# Patient Record
Sex: MALE
[De-identification: names, ages, dates, MRNs, and addresses within clinical notes are randomized per-mention and may not be internally consistent; named-entity substitution may affect disease eponyms.]

---

## 2024-06-13 ENCOUNTER — EXTERNAL RECORD (OUTPATIENT)
Dept: OTHER | Facility: PHYSICIAN GROUP | Age: 54
End: 2024-06-13

## 2024-06-13 ENCOUNTER — EXTERNAL RECORD (OUTPATIENT)
Dept: NEPHROLOGY | Age: 54
End: 2024-06-13

## 2024-06-13 ENCOUNTER — OFF PREMISE (OUTPATIENT)
Dept: NEPHROLOGY | Age: 54
End: 2024-06-13

## 2024-06-13 ENCOUNTER — TELEPHONE (OUTPATIENT)
Dept: CARDIOLOGY | Age: 54
End: 2024-06-13

## 2024-06-13 ENCOUNTER — EXTERNAL RECORD (OUTPATIENT)
Dept: OTHER | Age: 54
End: 2024-06-13

## 2024-06-13 ENCOUNTER — APPOINTMENT (OUTPATIENT)
Dept: OTHER | Facility: PHYSICIAN GROUP | Age: 54
End: 2024-06-13

## 2024-06-13 DIAGNOSIS — N18.9 CHRONIC KIDNEY DISEASE, UNSPECIFIED CKD STAGE: ICD-10-CM

## 2024-06-13 DIAGNOSIS — E87.6 HYPOKALEMIA: ICD-10-CM

## 2024-06-13 DIAGNOSIS — N18.5 CKD (CHRONIC KIDNEY DISEASE) STAGE 5, GFR LESS THAN 15 ML/MIN  (CMD): ICD-10-CM

## 2024-06-13 DIAGNOSIS — I10 PRIMARY HYPERTENSION: ICD-10-CM

## 2024-06-13 DIAGNOSIS — E87.1 HYPONATREMIA: ICD-10-CM

## 2024-06-13 DIAGNOSIS — N17.9 AKI (ACUTE KIDNEY INJURY) (CMD): Primary | ICD-10-CM

## 2024-06-13 DIAGNOSIS — E83.51 HYPOCALCEMIA: ICD-10-CM

## 2024-06-13 DIAGNOSIS — I10 ESSENTIAL HYPERTENSION: ICD-10-CM

## 2024-06-13 DIAGNOSIS — E78.2 MIXED HYPERLIPIDEMIA: ICD-10-CM

## 2024-06-13 DIAGNOSIS — E87.20 ACIDOSIS: ICD-10-CM

## 2024-06-13 DIAGNOSIS — I73.9 PVD (PERIPHERAL VASCULAR DISEASE) (CMD): ICD-10-CM

## 2024-06-13 DIAGNOSIS — I21.11 ST ELEVATION MYOCARDIAL INFARCTION INVOLVING RIGHT CORONARY ARTERY  (CMD): Primary | ICD-10-CM

## 2024-06-13 DIAGNOSIS — I25.2 HISTORY OF ST ELEVATION MYOCARDIAL INFARCTION (STEMI): Primary | ICD-10-CM

## 2024-06-13 DIAGNOSIS — I21.3 ST ELEVATION MYOCARDIAL INFARCTION (STEMI), UNSPECIFIED ARTERY  (CMD): ICD-10-CM

## 2024-06-13 PROBLEM — Z78.9 NONSMOKER: Status: ACTIVE | Noted: 2024-06-13

## 2024-06-13 PROBLEM — I25.10 CAD (CORONARY ARTERY DISEASE): Status: ACTIVE | Noted: 2024-06-13

## 2024-06-13 PROBLEM — E78.5 HLD (HYPERLIPIDEMIA): Status: ACTIVE | Noted: 2024-06-13

## 2024-06-13 LAB
*MEAN CORPUSCULAR HGB CONC: 34 G/DL (ref 32.5–35.8)
A/G RATIO: 1.32 (ref 1.1–2.4)
ALANINE AMINOTRANSFE: 28 U/L (ref 7–52)
ALBUMIN, SERUM (ALB): 4.1 G/DL (ref 3.5–5.7)
ALKALINE PHOSPHATASE (ALK): 133 U/L (ref 34–104)
ANION GAP: 20 MEQ/L (ref 6–15)
ASPARTATE AMINOTRANS: 58 U/L (ref 13–39)
BASOPHIL AUTOMATED: 0.3 %
BASOPHILS: 0 (ref 0–0.14)
BILIRUBIN, TOTAL: 0.4 MG/DL (ref 0.3–1)
BLOOD UREA NITROGEN (BUN): 149 MG/DL (ref 7–25)
BUN/CREATININE RATIO: 11.6 (ref 7.4–23)
CALCIUM ALBUMIN ADJUSTED: 7.1 MG/DL (ref 8.6–10.3)
CALCIUM, SERUM: 7.2 MG/DL (ref 8.6–10.3)
CARBON DIOXIDE: 13 MEQ/L (ref 21–31)
CHLORIDE, SERUM: 99 MMOL/L (ref 98–107)
CHOLESTEROL HDL RATIO: 5
CHOLESTEROL: 136 MG/DL
CHRONIC KIDNEY DISEASE STAGE: ABNORMAL
CREATININE: 12.86 MG/DL (ref 0.7–1.3)
EOSINOPHIL AUTOMATED: 3.8 %
EOSINOPHILS: 0.5 (ref 0–0.6)
EST GLOMERULAR FILTRATION RATE: 4 ML/MIN
GLUCOSE: 213 MG/DL (ref 70–99)
HDL CHOLESTEROL: 27 MG/DL
HEMATOCRIT: 24.6 % (ref 38.6–49.2)
HEMOGLOBIN: 8.3 GM/DL (ref 13–17)
K (POTASSIUM, SERUM): 3.4 MMOL/L (ref 3.5–5.2)
LDL CHOLESTEROL DIRECT: 90 MG/DL (ref 0–129)
LYMPHOCYTE AUTOMATED: 5.4 %
LYMPHOCYTES: 0.7 (ref 0.78–3.73)
MEAN CORPUSCULAR HGB: 27.4 PG (ref 26–34)
MEAN CORPUSCULAR VOL: 80.6 FL (ref 80–100)
MEAN PLATELET VOLUME: 9.4 FL (ref 6.8–10.2)
MG (MAGNESIUM, SERUM: 1.7 MG/DL (ref 1.6–2.6)
MONOCYTE AUTOMATED: 3.5 %
MONOCYTES: 0.4 (ref 0.17–1)
NA (SODIUM, SERUM): 132 MMOL/L (ref 133–144)
NEUTROPHIL ABSOLUTE: 10.8 (ref 1.91–7.6)
NEUTROPHIL AUTOMATED: 87 %
NON HDL CHOLESTEROL: 109 MG/DL
PHOSPHORUS, SERUM: 8.9 MG/DL (ref 2.5–4.5)
PLATELET COUNT: 425 K/MM3 (ref 150–450)
PROTEIN TOTAL: 7.2 G/DL (ref 6.4–8.9)
RED BLOOD CELL COUNT: 3.05 M/MM3 (ref 4.34–5.6)
RED CELL DISTRIBUTIO: 13.4 % (ref 11.9–15.9)
TRIGLYCERIDES: 93 MG/DL
TROPONIN I HIGH SENSITIVITY: ABNORMAL PG/ML (ref 0–20)
TROPONIN I HIGH SENSITIVITY: ABNORMAL PG/ML (ref 0–20)
VLDL CHOLESTEROL: 19 MG/DL (ref 5–30)
WHITE BLOOD CELL COU: 12.4 K/MM3 (ref 4–11)

## 2024-06-13 PROCEDURE — 99255 IP/OBS CONSLTJ NEW/EST HI 80: CPT | Performed by: INTERNAL MEDICINE

## 2024-06-13 PROCEDURE — 92978 ENDOLUMINL IVUS OCT C 1ST: CPT | Performed by: INTERNAL MEDICINE

## 2024-06-13 PROCEDURE — 99152 MOD SED SAME PHYS/QHP 5/>YRS: CPT | Performed by: INTERNAL MEDICINE

## 2024-06-13 PROCEDURE — 99291 CRITICAL CARE FIRST HOUR: CPT | Performed by: INTERNAL MEDICINE

## 2024-06-13 PROCEDURE — 93458 L HRT ARTERY/VENTRICLE ANGIO: CPT | Performed by: INTERNAL MEDICINE

## 2024-06-13 PROCEDURE — 92941 PRQ TRLML REVSC TOT OCCL AMI: CPT | Performed by: INTERNAL MEDICINE

## 2024-06-14 ENCOUNTER — EXTERNAL RECORD (OUTPATIENT)
Dept: HEALTH INFORMATION MANAGEMENT | Facility: OTHER | Age: 54
End: 2024-06-14

## 2024-06-14 ENCOUNTER — OFF PREMISE (OUTPATIENT)
Dept: NEPHROLOGY | Age: 54
End: 2024-06-14

## 2024-06-14 DIAGNOSIS — N13.30 HYDRONEPHROSIS, RIGHT: ICD-10-CM

## 2024-06-14 DIAGNOSIS — N17.9 AKI (ACUTE KIDNEY INJURY) (CMD): Primary | ICD-10-CM

## 2024-06-14 DIAGNOSIS — I10 ESSENTIAL HYPERTENSION: ICD-10-CM

## 2024-06-14 DIAGNOSIS — N18.9 CHRONIC KIDNEY DISEASE, UNSPECIFIED CKD STAGE: ICD-10-CM

## 2024-06-14 DIAGNOSIS — I21.3 ST ELEVATION MYOCARDIAL INFARCTION (STEMI), UNSPECIFIED ARTERY  (CMD): ICD-10-CM

## 2024-06-14 LAB
% SATURATION: 44 % (ref 20–55)
*MEAN CORPUSCULAR HGB CONC: 34.9 G/DL (ref 32.5–35.8)
A/G RATIO: 1.25 (ref 1.1–2.4)
ALANINE AMINOTRANSFE: 39 U/L (ref 7–52)
ALBUMIN, SERUM (ALB): 3.5 G/DL (ref 3.5–5.7)
ALKALINE PHOSPHATASE (ALK): 113 U/L (ref 34–104)
ANION GAP: 16 MEQ/L (ref 6–15)
ASPARTATE AMINOTRANS: 125 U/L (ref 13–39)
BASOPHIL AUTOMATED: 0.3 %
BASOPHILS: 0 (ref 0–0.14)
BEDSIDE GLUCOSE: 287 MG/DL (ref 70–110)
BILIRUBIN, TOTAL: 0.3 MG/DL (ref 0.3–1)
BLOOD UREA NITROGEN (BUN): 154 MG/DL (ref 7–25)
BUN/CREATININE RATIO: 12.2 (ref 7.4–23)
CALCIUM ALBUMIN ADJUSTED: 7.3 MG/DL (ref 8.6–10.3)
CALCIUM, SERUM: 6.9 MG/DL (ref 8.6–10.3)
CARBON DIOXIDE: 17 MEQ/L (ref 21–31)
CHLORIDE, SERUM: 104 MMOL/L (ref 98–107)
CHRONIC KIDNEY DISEASE STAGE: ABNORMAL
CREATININE: 12.58 MG/DL (ref 0.7–1.3)
EOSINOPHIL AUTOMATED: 14.2 %
EOSINOPHILS: 1.5 (ref 0–0.6)
EST GLOMERULAR FILTRATION RATE: 4 ML/MIN
FERRITIN: 321 NG/ML (ref 24–336)
GLUCOSE: 96 MG/DL (ref 70–99)
HEMATOCRIT: 21.9 % (ref 38.6–49.2)
HEMOGLOBIN: 7.6 GM/DL (ref 13–17)
HEPATITIS B CORE AB, TOTAL: NORMAL
HEPATITIS B SURFACE AB: <3.1 MIU/ML
HEPATITIS B SURFACE ANTIGEN: NORMAL
HEPATITIS C ANTIBODY: NORMAL
IRON BINDING CAPACIT: 223 UG/DL (ref 228–428)
IRON, SERUM (IRON): 99 UG/DL (ref 50–212)
K (POTASSIUM, SERUM): 3.6 MMOL/L (ref 3.5–5.2)
LYMPHOCYTE AUTOMATED: 8.1 %
LYMPHOCYTES: 0.9 (ref 0.78–3.73)
MEAN CORPUSCULAR HGB: 28.2 PG (ref 26–34)
MEAN CORPUSCULAR VOL: 80.7 FL (ref 80–100)
MEAN PLATELET VOLUME: 9.2 FL (ref 6.8–10.2)
MG (MAGNESIUM, SERUM: 2.1 MG/DL (ref 1.6–2.6)
MONOCYTE AUTOMATED: 9.9 %
MONOCYTES: 1.1 (ref 0.17–1)
MRSA SCREEN, PCR: NOT DETECTED
MRSA SOURCE: NORMAL
NA (SODIUM, SERUM): 137 MMOL/L (ref 133–144)
NEUTROPHIL ABSOLUTE: 7.2 (ref 1.91–7.6)
NEUTROPHIL AUTOMATED: 67.5 %
PHOSPHORUS, SERUM: 8.4 MG/DL (ref 2.5–4.5)
PLATELET COUNT: 351 K/MM3 (ref 150–450)
PROTEIN TOTAL: 6.3 G/DL (ref 6.4–8.9)
RED BLOOD CELL COUNT: 2.71 M/MM3 (ref 4.34–5.6)
RED CELL DISTRIBUTIO: 13.3 % (ref 11.9–15.9)
TROPONIN I HIGH SENSITIVITY: ABNORMAL PG/ML (ref 0–20)
TROPONIN I HIGH SENSITIVITY: ABNORMAL PG/ML (ref 0–20)
UNSATURATED IRON BINDING CAP: 124 UG/DL (ref 155–300)
WHITE BLOOD CELL COU: 10.6 K/MM3 (ref 4–11)

## 2024-06-14 PROCEDURE — 99232 SBSQ HOSP IP/OBS MODERATE 35: CPT | Performed by: INTERNAL MEDICINE

## 2024-06-15 ENCOUNTER — OFF PREMISE (OUTPATIENT)
Dept: NEPHROLOGY | Age: 54
End: 2024-06-15

## 2024-06-15 DIAGNOSIS — D63.1 ANEMIA DUE TO END STAGE RENAL DISEASE  (CMD): ICD-10-CM

## 2024-06-15 DIAGNOSIS — Z99.2 ESRD (END STAGE RENAL DISEASE) ON DIALYSIS  (CMD): Primary | ICD-10-CM

## 2024-06-15 DIAGNOSIS — N18.6 ESRD (END STAGE RENAL DISEASE) ON DIALYSIS  (CMD): Primary | ICD-10-CM

## 2024-06-15 DIAGNOSIS — D62 ACUTE BLOOD LOSS ANEMIA: ICD-10-CM

## 2024-06-15 DIAGNOSIS — E11.21 DIABETIC NEPHROPATHY ASSOCIATED WITH TYPE 2 DIABETES MELLITUS  (CMD): ICD-10-CM

## 2024-06-15 DIAGNOSIS — N18.6 ANEMIA DUE TO END STAGE RENAL DISEASE  (CMD): ICD-10-CM

## 2024-06-15 DIAGNOSIS — I21.3 ST ELEVATION MYOCARDIAL INFARCTION (STEMI), UNSPECIFIED ARTERY  (CMD): ICD-10-CM

## 2024-06-15 LAB
*MEAN CORPUSCULAR HGB CONC: 35.8 G/DL (ref 32.5–35.8)
ALBUMIN, SERUM (ALB): 3.3 G/DL (ref 3.5–5.7)
ANION GAP: 11 MEQ/L (ref 6–15)
BASOPHIL AUTOMATED: 0.4 %
BASOPHILS: 0 (ref 0–0.14)
BLOOD UREA NITROGEN (BUN): 84 MG/DL (ref 7–25)
BUN/CREATININE RATIO: 9.6 (ref 7.4–23)
CALCIUM ALBUMIN ADJUSTED: 7.7 MG/DL (ref 8.6–10.3)
CALCIUM, SERUM: 7.1 MG/DL (ref 8.6–10.3)
CARBON DIOXIDE: 24 MEQ/L (ref 21–31)
CHLORIDE, SERUM: 102 MMOL/L (ref 98–107)
CHRONIC KIDNEY DISEASE STAGE: ABNORMAL
CREATININE: 8.73 MG/DL (ref 0.7–1.3)
EOSINOPHIL AUTOMATED: 10.8 %
EOSINOPHILS: 1 (ref 0–0.6)
EST GLOMERULAR FILTRATION RATE: 7 ML/MIN
ESTIMATED AVERAGE GLUCOSE: 174 MG/DL
GLUCOSE: 164 MG/DL (ref 70–99)
HEMATOCRIT: 18.5 % (ref 38.6–49.2)
HEMOGLOBIN A1C (GLYCOSYLATED): 7.7 %
HEMOGLOBIN: 6.6 GM/DL (ref 13–17)
K (POTASSIUM, SERUM): 3.6 MMOL/L (ref 3.5–5.2)
LYMPHOCYTE AUTOMATED: 7.1 %
LYMPHOCYTES: 0.6 (ref 0.78–3.73)
MEAN CORPUSCULAR HGB: 28.7 PG (ref 26–34)
MEAN CORPUSCULAR VOL: 80 FL (ref 80–100)
MEAN PLATELET VOLUME: 9.3 FL (ref 6.8–10.2)
MG (MAGNESIUM, SERUM: 1.8 MG/DL (ref 1.6–2.6)
MONOCYTE AUTOMATED: 8.8 %
MONOCYTES: 0.8 (ref 0.17–1)
NA (SODIUM, SERUM): 137 MMOL/L (ref 133–144)
NEUTROPHIL ABSOLUTE: 6.5 (ref 1.91–7.6)
NEUTROPHIL AUTOMATED: 72.9 %
PLATELET COUNT: 293 K/MM3 (ref 150–450)
RED BLOOD CELL COUNT: 2.31 M/MM3 (ref 4.34–5.6)
RED CELL DISTRIBUTIO: 12.8 % (ref 11.9–15.9)
SLIDE REVIEW COMMENT: ABNORMAL
WHITE BLOOD CELL COU: 8.9 K/MM3 (ref 4–11)

## 2024-06-15 PROCEDURE — 99232 SBSQ HOSP IP/OBS MODERATE 35: CPT | Performed by: INTERNAL MEDICINE

## 2024-06-16 ENCOUNTER — OFF PREMISE (OUTPATIENT)
Dept: NEPHROLOGY | Age: 54
End: 2024-06-16

## 2024-06-16 DIAGNOSIS — D62 ACUTE BLOOD LOSS ANEMIA: ICD-10-CM

## 2024-06-16 DIAGNOSIS — N18.6 ESRD (END STAGE RENAL DISEASE) ON DIALYSIS  (CMD): Primary | ICD-10-CM

## 2024-06-16 DIAGNOSIS — D63.1 ANEMIA DUE TO END STAGE RENAL DISEASE  (CMD): ICD-10-CM

## 2024-06-16 DIAGNOSIS — N18.6 ANEMIA DUE TO END STAGE RENAL DISEASE  (CMD): ICD-10-CM

## 2024-06-16 DIAGNOSIS — I21.3 ST ELEVATION MYOCARDIAL INFARCTION (STEMI), UNSPECIFIED ARTERY  (CMD): ICD-10-CM

## 2024-06-16 DIAGNOSIS — I10 ESSENTIAL HYPERTENSION: ICD-10-CM

## 2024-06-16 DIAGNOSIS — Z99.2 ESRD (END STAGE RENAL DISEASE) ON DIALYSIS  (CMD): Primary | ICD-10-CM

## 2024-06-16 LAB
*MEAN CORPUSCULAR HGB CONC: 35.3 G/DL (ref 32.5–35.8)
ALBUMIN, SERUM (ALB): 3.2 G/DL (ref 3.5–5.7)
ANION GAP: 14 MEQ/L (ref 6–15)
ANISOCYTOSIS: NORMAL
BASOPHIL AUTOMATED: 0.4 %
BASOPHILS: 0 (ref 0–0.14)
BEDSIDE GLUCOSE: 175 MG/DL (ref 70–110)
BEDSIDE GLUCOSE: 221 MG/DL (ref 70–110)
BEDSIDE GLUCOSE: 243 MG/DL (ref 70–110)
BLOOD UREA NITROGEN (BUN): 94 MG/DL (ref 7–25)
BUN/CREATININE RATIO: 9.1 (ref 7.4–23)
CALCIUM ALBUMIN ADJUSTED: 7.7 MG/DL (ref 8.6–10.3)
CALCIUM, SERUM: 7.1 MG/DL (ref 8.6–10.3)
CARBON DIOXIDE: 24 MEQ/L (ref 21–31)
CHLORIDE, SERUM: 100 MMOL/L (ref 98–107)
CHRONIC KIDNEY DISEASE STAGE: ABNORMAL
CREATININE: 10.3 MG/DL (ref 0.7–1.3)
EOSINOPHIL AUTOMATED: 12.8 %
EOSINOPHILS: 1.1 (ref 0–0.6)
EST GLOMERULAR FILTRATION RATE: 5 ML/MIN
GLUCOSE: 133 MG/DL (ref 70–99)
HEMATOCRIT: 18.5 % (ref 38.6–49.2)
HEMATOCRIT: 20.4 % (ref 38.6–49.2)
HEMOGLOBIN: 6.5 GM/DL (ref 13–17)
HEMOGLOBIN: 7.1 GM/DL (ref 13–17)
K (POTASSIUM, SERUM): 4 MMOL/L (ref 3.5–5.2)
LYMPHOCYTE AUTOMATED: 10.3 %
LYMPHOCYTES: 0.9 (ref 0.78–3.73)
MEAN CORPUSCULAR HGB: 28.3 PG (ref 26–34)
MEAN CORPUSCULAR VOL: 80.2 FL (ref 80–100)
MEAN PLATELET VOLUME: 9.5 FL (ref 6.8–10.2)
MG (MAGNESIUM, SERUM: 2.2 MG/DL (ref 1.6–2.6)
MICROCYTES: NORMAL
MONOCYTE AUTOMATED: 9 %
MONOCYTES: 0.8 (ref 0.17–1)
NA (SODIUM, SERUM): 138 MMOL/L (ref 133–144)
NEUTROPHIL ABSOLUTE: 5.8 (ref 1.91–7.6)
NEUTROPHIL AUTOMATED: 67.5 %
OVALOCYTES: NORMAL
PLATELET COUNT: 274 K/MM3 (ref 150–450)
POIKILOCYTOSIS: NORMAL
RED BLOOD CELL COUNT: 2.31 M/MM3 (ref 4.34–5.6)
RED CELL DISTRIBUTIO: 13.4 % (ref 11.9–15.9)
WHITE BLOOD CELL COU: 8.5 K/MM3 (ref 4–11)

## 2024-06-16 PROCEDURE — 99232 SBSQ HOSP IP/OBS MODERATE 35: CPT | Performed by: INTERNAL MEDICINE

## 2024-06-17 ENCOUNTER — OFF PREMISE (OUTPATIENT)
Dept: NEPHROLOGY | Age: 54
End: 2024-06-17

## 2024-06-17 DIAGNOSIS — I21.3 ST ELEVATION MYOCARDIAL INFARCTION (STEMI), UNSPECIFIED ARTERY  (CMD): ICD-10-CM

## 2024-06-17 DIAGNOSIS — Z99.2 ESRD (END STAGE RENAL DISEASE) ON DIALYSIS  (CMD): Primary | ICD-10-CM

## 2024-06-17 DIAGNOSIS — N18.6 ANEMIA DUE TO END STAGE RENAL DISEASE  (CMD): ICD-10-CM

## 2024-06-17 DIAGNOSIS — D62 ACUTE BLOOD LOSS ANEMIA: ICD-10-CM

## 2024-06-17 DIAGNOSIS — D63.1 ANEMIA DUE TO END STAGE RENAL DISEASE  (CMD): ICD-10-CM

## 2024-06-17 DIAGNOSIS — I10 ESSENTIAL HYPERTENSION: ICD-10-CM

## 2024-06-17 DIAGNOSIS — N18.6 ESRD (END STAGE RENAL DISEASE) ON DIALYSIS  (CMD): Primary | ICD-10-CM

## 2024-06-17 LAB
*MEAN CORPUSCULAR HGB CONC: 35.7 G/DL (ref 32.5–35.8)
ALBUMIN, SERUM (ALB): 3.3 G/DL (ref 3.5–5.7)
ANION GAP: 9 MEQ/L (ref 6–15)
BASOPHIL AUTOMATED: 0.9 %
BASOPHILS: 0.1 (ref 0–0.14)
BEDSIDE GLUCOSE: 129 MG/DL (ref 70–110)
BEDSIDE GLUCOSE: 129 MG/DL (ref 70–110)
BEDSIDE GLUCOSE: 182 MG/DL (ref 70–110)
BEDSIDE GLUCOSE: 204 MG/DL (ref 70–110)
BLOOD UREA NITROGEN (BUN): 47 MG/DL (ref 7–25)
BUN/CREATININE RATIO: 7.2 (ref 7.4–23)
CALCIUM ALBUMIN ADJUSTED: 8.6 MG/DL (ref 8.6–10.3)
CALCIUM, SERUM: 8 MG/DL (ref 8.6–10.3)
CARBON DIOXIDE: 29 MEQ/L (ref 21–31)
CHLORIDE, SERUM: 101 MMOL/L (ref 98–107)
CHRONIC KIDNEY DISEASE STAGE: ABNORMAL
CREATININE: 6.55 MG/DL (ref 0.7–1.3)
EOSINOPHIL AUTOMATED: 18.4 %
EOSINOPHILS: 1.7 (ref 0–0.6)
EST GLOMERULAR FILTRATION RATE: 9 ML/MIN
GLUCOSE: 148 MG/DL (ref 70–99)
HEMATOCRIT: 21.2 % (ref 38.6–49.2)
HEMATOCRIT: 21.2 % (ref 38.6–49.2)
HEMOGLOBIN: 7.2 GM/DL (ref 13–17)
HEMOGLOBIN: 7.6 GM/DL (ref 13–17)
INTERNATIONAL NORMAL: 1 (ref 0.8–1.1)
K (POTASSIUM, SERUM): 3.8 MMOL/L (ref 3.5–5.2)
LYMPHOCYTE AUTOMATED: 11.9 %
LYMPHOCYTES: 1.1 (ref 0.78–3.73)
MEAN CORPUSCULAR HGB: 29.4 PG (ref 26–34)
MEAN CORPUSCULAR VOL: 82.3 FL (ref 80–100)
MEAN PLATELET VOLUME: 9 FL (ref 6.8–10.2)
MG (MAGNESIUM, SERUM: 2 MG/DL (ref 1.6–2.6)
MONOCYTE AUTOMATED: 9.5 %
MONOCYTES: 0.9 (ref 0.17–1)
NA (SODIUM, SERUM): 139 MMOL/L (ref 133–144)
NEUTROPHIL ABSOLUTE: 5.4 (ref 1.91–7.6)
NEUTROPHIL AUTOMATED: 59.3 %
PERIPHERAL BLOOD SMEAR, PATH: NORMAL
PLATELET COUNT: 264 K/MM3 (ref 150–450)
PROTHROMBIN TIME (PATIENT): 11.5 SECONDS (ref 10.1–13.1)
RED BLOOD CELL COUNT: 2.58 M/MM3 (ref 4.34–5.6)
RED CELL DISTRIBUTIO: 14 % (ref 11.9–15.9)
VITAMIN D 1,25 DIHYDROXY TOTAL: 8.5 PG/ML (ref 19.9–79.3)
WHITE BLOOD CELL COU: 9.2 K/MM3 (ref 4–11)

## 2024-06-17 PROCEDURE — 99232 SBSQ HOSP IP/OBS MODERATE 35: CPT | Performed by: INTERNAL MEDICINE

## 2024-06-18 ENCOUNTER — OFF PREMISE (OUTPATIENT)
Dept: NEPHROLOGY | Age: 54
End: 2024-06-18

## 2024-06-18 DIAGNOSIS — D63.1 ANEMIA DUE TO END STAGE RENAL DISEASE  (CMD): ICD-10-CM

## 2024-06-18 DIAGNOSIS — N18.6 ESRD (END STAGE RENAL DISEASE) ON DIALYSIS  (CMD): Primary | ICD-10-CM

## 2024-06-18 DIAGNOSIS — I10 ESSENTIAL HYPERTENSION: ICD-10-CM

## 2024-06-18 DIAGNOSIS — N18.6 ANEMIA DUE TO END STAGE RENAL DISEASE  (CMD): ICD-10-CM

## 2024-06-18 DIAGNOSIS — E11.21 DIABETIC NEPHROPATHY ASSOCIATED WITH TYPE 2 DIABETES MELLITUS  (CMD): ICD-10-CM

## 2024-06-18 DIAGNOSIS — I21.3 ST ELEVATION MYOCARDIAL INFARCTION (STEMI), UNSPECIFIED ARTERY  (CMD): ICD-10-CM

## 2024-06-18 DIAGNOSIS — Z99.2 ESRD (END STAGE RENAL DISEASE) ON DIALYSIS  (CMD): Primary | ICD-10-CM

## 2024-06-18 LAB
*MEAN CORPUSCULAR HGB CONC: 35.5 G/DL (ref 32.5–35.8)
ALBUMIN, SERUM (ALB): 3.5 G/DL (ref 3.5–5.7)
ANION GAP: 6 MEQ/L (ref 6–15)
BASOPHIL AUTOMATED: 0.6 %
BASOPHILS: 0.1 (ref 0–0.14)
BEDSIDE GLUCOSE: 144 MG/DL (ref 70–110)
BEDSIDE GLUCOSE: 161 MG/DL (ref 70–110)
BEDSIDE GLUCOSE: 173 MG/DL (ref 70–110)
BEDSIDE GLUCOSE: 294 MG/DL (ref 70–110)
BLOOD UREA NITROGEN (BUN): 62 MG/DL (ref 7–25)
BUN/CREATININE RATIO: 7.8 (ref 7.4–23)
CALCIUM ALBUMIN ADJUSTED: 8.8 MG/DL (ref 8.6–10.3)
CALCIUM, SERUM: 8.4 MG/DL (ref 8.6–10.3)
CARBON DIOXIDE: 26 MEQ/L (ref 21–31)
CHLORIDE, SERUM: 101 MMOL/L (ref 98–107)
CHRONIC KIDNEY DISEASE STAGE: ABNORMAL
CREATININE: 7.94 MG/DL (ref 0.7–1.3)
EOSINOPHIL AUTOMATED: 16.5 %
EOSINOPHILS: 1.8 (ref 0–0.6)
EST GLOMERULAR FILTRATION RATE: 7 ML/MIN
GLUCOSE: 146 MG/DL (ref 70–99)
HEMATOCRIT: 22.9 % (ref 38.6–49.2)
HEMOGLOBIN: 8.1 GM/DL (ref 13–17)
K (POTASSIUM, SERUM): 4.3 MMOL/L (ref 3.5–5.2)
LYMPHOCYTE AUTOMATED: 9.4 %
LYMPHOCYTES: 1 (ref 0.78–3.73)
MEAN CORPUSCULAR HGB: 29.4 PG (ref 26–34)
MEAN CORPUSCULAR VOL: 82.8 FL (ref 80–100)
MEAN PLATELET VOLUME: 9 FL (ref 6.8–10.2)
MG (MAGNESIUM, SERUM: 2.2 MG/DL (ref 1.6–2.6)
MONOCYTE AUTOMATED: 9.8 %
MONOCYTES: 1.1 (ref 0.17–1)
NA (SODIUM, SERUM): 133 MMOL/L (ref 133–144)
NEUTROPHIL ABSOLUTE: 6.9 (ref 1.91–7.6)
NEUTROPHIL AUTOMATED: 63.7 %
PHOSPHORUS, SERUM: 6.8 MG/DL (ref 2.5–4.5)
PLATELET COUNT: 313 K/MM3 (ref 150–450)
RED BLOOD CELL COUNT: 2.77 M/MM3 (ref 4.34–5.6)
RED CELL DISTRIBUTIO: 13.8 % (ref 11.9–15.9)
WHITE BLOOD CELL COU: 10.8 K/MM3 (ref 4–11)

## 2024-06-18 PROCEDURE — 99232 SBSQ HOSP IP/OBS MODERATE 35: CPT | Performed by: INTERNAL MEDICINE

## 2024-06-19 ENCOUNTER — OFF PREMISE (OUTPATIENT)
Dept: NEPHROLOGY | Age: 54
End: 2024-06-19

## 2024-06-19 DIAGNOSIS — I21.3 ST ELEVATION MYOCARDIAL INFARCTION (STEMI), UNSPECIFIED ARTERY  (CMD): ICD-10-CM

## 2024-06-19 DIAGNOSIS — N18.6 ANEMIA DUE TO END STAGE RENAL DISEASE  (CMD): ICD-10-CM

## 2024-06-19 DIAGNOSIS — N18.6 ESRD (END STAGE RENAL DISEASE) ON DIALYSIS  (CMD): Primary | ICD-10-CM

## 2024-06-19 DIAGNOSIS — E11.21 DIABETIC NEPHROPATHY ASSOCIATED WITH TYPE 2 DIABETES MELLITUS  (CMD): ICD-10-CM

## 2024-06-19 DIAGNOSIS — D62 ACUTE BLOOD LOSS ANEMIA: ICD-10-CM

## 2024-06-19 DIAGNOSIS — Z99.2 ESRD (END STAGE RENAL DISEASE) ON DIALYSIS  (CMD): Primary | ICD-10-CM

## 2024-06-19 DIAGNOSIS — D63.1 ANEMIA DUE TO END STAGE RENAL DISEASE  (CMD): ICD-10-CM

## 2024-06-19 DIAGNOSIS — I10 ESSENTIAL HYPERTENSION: ICD-10-CM

## 2024-06-19 LAB
*MEAN CORPUSCULAR HGB CONC: 34.7 G/DL (ref 32.5–35.8)
A/G RATIO: 1.15 (ref 1.1–2.4)
ALANINE AMINOTRANSFE: 23 U/L (ref 7–52)
ALBUMIN, SERUM (ALB): 3.1 G/DL (ref 3.5–5.7)
ALKALINE PHOSPHATASE (ALK): 97 U/L (ref 34–104)
ANION GAP: 8 MEQ/L (ref 6–15)
ASPARTATE AMINOTRANS: 20 U/L (ref 13–39)
BASOPHIL AUTOMATED: 0.9 %
BASOPHILS: 0.1 (ref 0–0.14)
BEDSIDE GLUCOSE: 188 MG/DL (ref 70–110)
BEDSIDE GLUCOSE: 194 MG/DL (ref 70–110)
BEDSIDE GLUCOSE: 209 MG/DL (ref 70–110)
BEDSIDE GLUCOSE: 227 MG/DL (ref 70–110)
BILIRUBIN, TOTAL: 0.2 MG/DL (ref 0.3–1)
BLOOD UREA NITROGEN (BUN): 32 MG/DL (ref 7–25)
BUN/CREATININE RATIO: 6.3 (ref 7.4–23)
CALCIUM ALBUMIN ADJUSTED: 9.1 MG/DL (ref 8.6–10.3)
CALCIUM, SERUM: 8.4 MG/DL (ref 8.6–10.3)
CARBON DIOXIDE: 28 MEQ/L (ref 21–31)
CHLORIDE, SERUM: 103 MMOL/L (ref 98–107)
CHRONIC KIDNEY DISEASE STAGE: ABNORMAL
CREATININE: 5.08 MG/DL (ref 0.7–1.3)
EOSINOPHIL AUTOMATED: 16.9 %
EOSINOPHILS: 1.3 (ref 0–0.6)
EST GLOMERULAR FILTRATION RATE: 13 ML/MIN
GLUCOSE: 174 MG/DL (ref 70–99)
HEMATOCRIT: 21 % (ref 38.6–49.2)
HEMOGLOBIN: 7.3 GM/DL (ref 13–17)
K (POTASSIUM, SERUM): 4.3 MMOL/L (ref 3.5–5.2)
LYMPHOCYTE AUTOMATED: 11.8 %
LYMPHOCYTES: 0.9 (ref 0.78–3.73)
MEAN CORPUSCULAR HGB: 29.2 PG (ref 26–34)
MEAN CORPUSCULAR VOL: 84 FL (ref 80–100)
MEAN PLATELET VOLUME: 9 FL (ref 6.8–10.2)
MONOCYTE AUTOMATED: 10.2 %
MONOCYTES: 0.8 (ref 0.17–1)
NA (SODIUM, SERUM): 139 MMOL/L (ref 133–144)
NEUTROPHIL ABSOLUTE: 4.7 (ref 1.91–7.6)
NEUTROPHIL AUTOMATED: 60.2 %
PHOSPHORUS, SERUM: 5 MG/DL (ref 2.5–4.5)
PLATELET COUNT: 296 K/MM3 (ref 150–450)
PROTEIN TOTAL: 5.8 G/DL (ref 6.4–8.9)
RED BLOOD CELL COUNT: 2.5 M/MM3 (ref 4.34–5.6)
RED CELL DISTRIBUTIO: 13.7 % (ref 11.9–15.9)
WHITE BLOOD CELL COU: 7.9 K/MM3 (ref 4–11)

## 2024-06-19 PROCEDURE — 99232 SBSQ HOSP IP/OBS MODERATE 35: CPT | Performed by: INTERNAL MEDICINE

## 2024-06-20 ENCOUNTER — OFF PREMISE (OUTPATIENT)
Dept: NEPHROLOGY | Age: 54
End: 2024-06-20

## 2024-06-20 DIAGNOSIS — N18.6 ESRD (END STAGE RENAL DISEASE) ON DIALYSIS  (CMD): Primary | ICD-10-CM

## 2024-06-20 DIAGNOSIS — Z99.2 ESRD (END STAGE RENAL DISEASE) ON DIALYSIS  (CMD): Primary | ICD-10-CM

## 2024-06-20 LAB
*MEAN CORPUSCULAR HGB CONC: 34.6 G/DL (ref 32.5–35.8)
ANION GAP: 14 MEQ/L (ref 6–15)
BASOPHIL AUTOMATED: 0.9 %
BASOPHILS: 0.1 (ref 0–0.14)
BEDSIDE GLUCOSE: 155 MG/DL (ref 70–110)
BEDSIDE GLUCOSE: 196 MG/DL (ref 70–110)
BEDSIDE GLUCOSE: 212 MG/DL (ref 70–110)
BEDSIDE GLUCOSE: 268 MG/DL (ref 70–110)
BLOOD UREA NITROGEN (BUN): 52 MG/DL (ref 7–25)
BUN/CREATININE RATIO: 7.8 (ref 7.4–23)
CALCIUM, SERUM: 8.9 MG/DL (ref 8.6–10.3)
CARBON DIOXIDE: 27 MEQ/L (ref 21–31)
CHLORIDE, SERUM: 97 MMOL/L (ref 98–107)
CHRONIC KIDNEY DISEASE STAGE: ABNORMAL
CREATININE: 6.69 MG/DL (ref 0.7–1.3)
EOSINOPHIL AUTOMATED: 18.6 %
EOSINOPHILS: 1.6 (ref 0–0.6)
EST GLOMERULAR FILTRATION RATE: 9 ML/MIN
GLUCOSE: 202 MG/DL (ref 70–99)
HEMATOCRIT: 22.1 % (ref 38.6–49.2)
HEMOGLOBIN: 7.7 GM/DL (ref 13–17)
K (POTASSIUM, SERUM): 4.2 MMOL/L (ref 3.5–5.2)
LYMPHOCYTE AUTOMATED: 17.1 %
LYMPHOCYTES: 1.5 (ref 0.78–3.73)
MEAN CORPUSCULAR HGB: 29.4 PG (ref 26–34)
MEAN CORPUSCULAR VOL: 84.9 FL (ref 80–100)
MEAN PLATELET VOLUME: 9.2 FL (ref 6.8–10.2)
MONOCYTE AUTOMATED: 10.9 %
MONOCYTES: 0.9 (ref 0.17–1)
NA (SODIUM, SERUM): 138 MMOL/L (ref 133–144)
NEUTROPHIL ABSOLUTE: 4.5 (ref 1.91–7.6)
NEUTROPHIL AUTOMATED: 52.5 %
PHOSPHORUS, SERUM: 5.8 MG/DL (ref 2.5–4.5)
PLATELET COUNT: 351 K/MM3 (ref 150–450)
RED BLOOD CELL COUNT: 2.6 M/MM3 (ref 4.34–5.6)
RED CELL DISTRIBUTIO: 13.7 % (ref 11.9–15.9)
WHITE BLOOD CELL COU: 8.6 K/MM3 (ref 4–11)

## 2024-06-20 PROCEDURE — 90935 HEMODIALYSIS ONE EVALUATION: CPT | Performed by: INTERNAL MEDICINE

## 2024-06-21 LAB
*MEAN CORPUSCULAR HGB CONC: 35.1 G/DL (ref 32.5–35.8)
ANION GAP: 9 MEQ/L (ref 6–15)
BASOPHIL AUTOMATED: 1 %
BASOPHILS: 0.1 (ref 0–0.14)
BEDSIDE GLUCOSE: 181 MG/DL (ref 70–110)
BEDSIDE GLUCOSE: 184 MG/DL (ref 70–110)
BEDSIDE GLUCOSE: 190 MG/DL (ref 70–110)
BEDSIDE GLUCOSE: 205 MG/DL (ref 70–110)
BLOOD UREA NITROGEN (BUN): 36 MG/DL (ref 7–25)
BUN/CREATININE RATIO: 7.2 (ref 7.4–23)
CALCIUM, SERUM: 8.7 MG/DL (ref 8.6–10.3)
CARBON DIOXIDE: 28 MEQ/L (ref 21–31)
CHLORIDE, SERUM: 101 MMOL/L (ref 98–107)
CHRONIC KIDNEY DISEASE STAGE: ABNORMAL
CREATININE: 5.01 MG/DL (ref 0.7–1.3)
EOSINOPHIL AUTOMATED: 18 %
EOSINOPHILS: 1.6 (ref 0–0.6)
EST GLOMERULAR FILTRATION RATE: 13 ML/MIN
GLUCOSE: 203 MG/DL (ref 70–99)
HEMATOCRIT: 21.4 % (ref 38.6–49.2)
HEMOGLOBIN: 7.5 GM/DL (ref 13–17)
K (POTASSIUM, SERUM): 4 MMOL/L (ref 3.5–5.2)
LYMPHOCYTE AUTOMATED: 17.1 %
LYMPHOCYTES: 1.5 (ref 0.78–3.73)
MEAN CORPUSCULAR HGB: 29.7 PG (ref 26–34)
MEAN CORPUSCULAR VOL: 84.5 FL (ref 80–100)
MEAN PLATELET VOLUME: 9 FL (ref 6.8–10.2)
MONOCYTE AUTOMATED: 12.2 %
MONOCYTES: 1.1 (ref 0.17–1)
NA (SODIUM, SERUM): 138 MMOL/L (ref 133–144)
NEUTROPHIL ABSOLUTE: 4.5 (ref 1.91–7.6)
NEUTROPHIL AUTOMATED: 51.7 %
PHOSPHORUS, SERUM: 4.7 MG/DL (ref 2.5–4.5)
PLATELET COUNT: 393 K/MM3 (ref 150–450)
RED BLOOD CELL COUNT: 2.53 M/MM3 (ref 4.34–5.6)
RED CELL DISTRIBUTIO: 13.6 % (ref 11.9–15.9)
WHITE BLOOD CELL COU: 8.7 K/MM3 (ref 4–11)

## 2024-06-22 LAB
*MEAN CORPUSCULAR HGB CONC: 35.5 G/DL (ref 32.5–35.8)
ANION GAP: 12 MEQ/L (ref 6–15)
BASOPHIL AUTOMATED: 1.3 %
BASOPHILS: 0.1 (ref 0–0.14)
BEDSIDE GLUCOSE: 150 MG/DL (ref 70–110)
BEDSIDE GLUCOSE: 170 MG/DL (ref 70–110)
BEDSIDE GLUCOSE: 270 MG/DL (ref 70–110)
BEDSIDE GLUCOSE: 312 MG/DL (ref 70–110)
BLOOD UREA NITROGEN (BUN): 50 MG/DL (ref 7–25)
BUN/CREATININE RATIO: 7.8 (ref 7.4–23)
CALCIUM, SERUM: 8.8 MG/DL (ref 8.6–10.3)
CARBON DIOXIDE: 25 MEQ/L (ref 21–31)
CHLORIDE, SERUM: 98 MMOL/L (ref 98–107)
CHRONIC KIDNEY DISEASE STAGE: ABNORMAL
CREATININE: 6.45 MG/DL (ref 0.7–1.3)
EOSINOPHIL AUTOMATED: 17.5 %
EOSINOPHILS: 1.6 (ref 0–0.6)
EST GLOMERULAR FILTRATION RATE: 10 ML/MIN
GLUCOSE: 249 MG/DL (ref 70–99)
HEMATOCRIT: 20.5 % (ref 38.6–49.2)
HEMOGLOBIN: 7.3 GM/DL (ref 13–17)
K (POTASSIUM, SERUM): 3.8 MMOL/L (ref 3.5–5.2)
LYMPHOCYTE AUTOMATED: 14.3 %
LYMPHOCYTES: 1.3 (ref 0.78–3.73)
MEAN CORPUSCULAR HGB: 29.7 PG (ref 26–34)
MEAN CORPUSCULAR VOL: 83.8 FL (ref 80–100)
MEAN PLATELET VOLUME: 8.6 FL (ref 6.8–10.2)
MG (MAGNESIUM, SERUM: 2.1 MG/DL (ref 1.6–2.6)
MONOCYTE AUTOMATED: 11.6 %
MONOCYTES: 1.1 (ref 0.17–1)
NA (SODIUM, SERUM): 135 MMOL/L (ref 133–144)
NEUTROPHIL ABSOLUTE: 5 (ref 1.91–7.6)
NEUTROPHIL AUTOMATED: 55.3 %
PHOSPHORUS, SERUM: 5 MG/DL (ref 2.5–4.5)
PLATELET COUNT: 439 K/MM3 (ref 150–450)
RED BLOOD CELL COUNT: 2.44 M/MM3 (ref 4.34–5.6)
RED CELL DISTRIBUTIO: 13.2 % (ref 11.9–15.9)
WHITE BLOOD CELL COU: 9.1 K/MM3 (ref 4–11)

## 2024-06-23 LAB
*MEAN CORPUSCULAR HGB CONC: 35.7 G/DL (ref 32.5–35.8)
ANION GAP: 9 MEQ/L (ref 6–15)
BASOPHIL AUTOMATED: 1.3 %
BASOPHILS: 0.1 (ref 0–0.14)
BEDSIDE GLUCOSE: 177 MG/DL (ref 70–110)
BEDSIDE GLUCOSE: 179 MG/DL (ref 70–110)
BEDSIDE GLUCOSE: 194 MG/DL (ref 70–110)
BEDSIDE GLUCOSE: 200 MG/DL (ref 70–110)
BLOOD UREA NITROGEN (BUN): 32 MG/DL (ref 7–25)
BUN/CREATININE RATIO: 6.5 (ref 7.4–23)
CALCIUM, SERUM: 9 MG/DL (ref 8.6–10.3)
CARBON DIOXIDE: 27 MEQ/L (ref 21–31)
CHLORIDE, SERUM: 98 MMOL/L (ref 98–107)
CHRONIC KIDNEY DISEASE STAGE: ABNORMAL
CREATININE: 4.89 MG/DL (ref 0.7–1.3)
EOSINOPHIL AUTOMATED: 17.2 %
EOSINOPHILS: 1.8 (ref 0–0.6)
EST GLOMERULAR FILTRATION RATE: 13 ML/MIN
GLUCOSE: 222 MG/DL (ref 70–99)
HEMATOCRIT: 22.1 % (ref 38.6–49.2)
HEMOGLOBIN: 7.9 GM/DL (ref 13–17)
K (POTASSIUM, SERUM): 4.3 MMOL/L (ref 3.5–5.2)
LYMPHOCYTE AUTOMATED: 15 %
LYMPHOCYTES: 1.6 (ref 0.78–3.73)
MEAN CORPUSCULAR HGB: 30.1 PG (ref 26–34)
MEAN CORPUSCULAR VOL: 84.4 FL (ref 80–100)
MEAN PLATELET VOLUME: 8.6 FL (ref 6.8–10.2)
MG (MAGNESIUM, SERUM: 2.1 MG/DL (ref 1.6–2.6)
MONOCYTE AUTOMATED: 12.8 %
MONOCYTES: 1.3 (ref 0.17–1)
NA (SODIUM, SERUM): 134 MMOL/L (ref 133–144)
NEUTROPHIL ABSOLUTE: 5.7 (ref 1.91–7.6)
NEUTROPHIL AUTOMATED: 53.7 %
PHOSPHORUS, SERUM: 4 MG/DL (ref 2.5–4.5)
PLATELET COUNT: 498 K/MM3 (ref 150–450)
RED BLOOD CELL COUNT: 2.62 M/MM3 (ref 4.34–5.6)
RED CELL DISTRIBUTIO: 13.6 % (ref 11.9–15.9)
WHITE BLOOD CELL COU: 10.5 K/MM3 (ref 4–11)

## 2024-06-24 ENCOUNTER — OFF PREMISE (OUTPATIENT)
Dept: NEPHROLOGY | Age: 54
End: 2024-06-24

## 2024-06-24 DIAGNOSIS — D63.1 ANEMIA DUE TO END STAGE RENAL DISEASE  (CMD): ICD-10-CM

## 2024-06-24 DIAGNOSIS — I21.3 ST ELEVATION MYOCARDIAL INFARCTION (STEMI), UNSPECIFIED ARTERY  (CMD): ICD-10-CM

## 2024-06-24 DIAGNOSIS — Z99.2 ESRD (END STAGE RENAL DISEASE) ON DIALYSIS  (CMD): Primary | ICD-10-CM

## 2024-06-24 DIAGNOSIS — N18.6 ANEMIA DUE TO END STAGE RENAL DISEASE  (CMD): ICD-10-CM

## 2024-06-24 DIAGNOSIS — N18.6 ESRD (END STAGE RENAL DISEASE) ON DIALYSIS  (CMD): Primary | ICD-10-CM

## 2024-06-24 DIAGNOSIS — I10 ESSENTIAL HYPERTENSION: ICD-10-CM

## 2024-06-24 LAB
BEDSIDE GLUCOSE: 116 MG/DL (ref 70–110)
BEDSIDE GLUCOSE: 117 MG/DL (ref 70–110)
BEDSIDE GLUCOSE: 146 MG/DL (ref 70–110)
BEDSIDE GLUCOSE: 232 MG/DL (ref 70–110)

## 2024-06-24 PROCEDURE — 99232 SBSQ HOSP IP/OBS MODERATE 35: CPT | Performed by: INTERNAL MEDICINE

## 2024-06-25 ENCOUNTER — OFF PREMISE (OUTPATIENT)
Dept: NEPHROLOGY | Age: 54
End: 2024-06-25

## 2024-06-25 DIAGNOSIS — Z99.2 ESRD (END STAGE RENAL DISEASE) ON DIALYSIS  (CMD): Primary | ICD-10-CM

## 2024-06-25 DIAGNOSIS — N18.6 ESRD (END STAGE RENAL DISEASE) ON DIALYSIS  (CMD): Primary | ICD-10-CM

## 2024-06-25 LAB
*MEAN CORPUSCULAR HGB CONC: 35.1 G/DL (ref 32.5–35.8)
ANION GAP: 11 MEQ/L (ref 6–15)
BASOPHIL AUTOMATED: 1.3 %
BASOPHILS: 0.1 (ref 0–0.14)
BEDSIDE GLUCOSE: 118 MG/DL (ref 70–110)
BEDSIDE GLUCOSE: 159 MG/DL (ref 70–110)
BEDSIDE GLUCOSE: 182 MG/DL (ref 70–110)
BEDSIDE GLUCOSE: 216 MG/DL (ref 70–110)
BLOOD UREA NITROGEN (BUN): 65 MG/DL (ref 7–25)
BUN/CREATININE RATIO: 8.5 (ref 7.4–23)
CALCIUM, SERUM: 9 MG/DL (ref 8.6–10.3)
CARBON DIOXIDE: 24 MEQ/L (ref 21–31)
CHLORIDE, SERUM: 96 MMOL/L (ref 98–107)
CHRONIC KIDNEY DISEASE STAGE: ABNORMAL
CREATININE: 7.63 MG/DL (ref 0.7–1.3)
EOSINOPHIL AUTOMATED: 18.7 %
EOSINOPHILS: 1.7 (ref 0–0.6)
EST GLOMERULAR FILTRATION RATE: 8 ML/MIN
GLUCOSE: 187 MG/DL (ref 70–99)
HEMATOCRIT: 21.8 % (ref 38.6–49.2)
HEMOGLOBIN: 7.6 GM/DL (ref 13–17)
K (POTASSIUM, SERUM): 4.8 MMOL/L (ref 3.5–5.2)
LYMPHOCYTE AUTOMATED: 16.3 %
LYMPHOCYTES: 1.5 (ref 0.78–3.73)
MEAN CORPUSCULAR HGB: 30.1 PG (ref 26–34)
MEAN CORPUSCULAR VOL: 85.7 FL (ref 80–100)
MEAN PLATELET VOLUME: 8 FL (ref 6.8–10.2)
MONOCYTE AUTOMATED: 13.1 %
MONOCYTES: 1.2 (ref 0.17–1)
NA (SODIUM, SERUM): 131 MMOL/L (ref 133–144)
NEUTROPHIL ABSOLUTE: 4.5 (ref 1.91–7.6)
NEUTROPHIL AUTOMATED: 50.6 %
PHOSPHORUS, SERUM: 5.5 MG/DL (ref 2.5–4.5)
PLATELET COUNT: 542 K/MM3 (ref 150–450)
RED BLOOD CELL COUNT: 2.54 M/MM3 (ref 4.34–5.6)
RED CELL DISTRIBUTIO: 14.1 % (ref 11.9–15.9)
WHITE BLOOD CELL COU: 8.9 K/MM3 (ref 4–11)

## 2024-06-25 PROCEDURE — 90935 HEMODIALYSIS ONE EVALUATION: CPT | Performed by: INTERNAL MEDICINE

## 2024-06-26 ENCOUNTER — OFF PREMISE (OUTPATIENT)
Dept: NEPHROLOGY | Age: 54
End: 2024-06-26

## 2024-06-26 DIAGNOSIS — N18.6 ESRD (END STAGE RENAL DISEASE) ON DIALYSIS  (CMD): Primary | ICD-10-CM

## 2024-06-26 DIAGNOSIS — I10 ESSENTIAL HYPERTENSION: ICD-10-CM

## 2024-06-26 DIAGNOSIS — I21.3 ST ELEVATION MYOCARDIAL INFARCTION (STEMI), UNSPECIFIED ARTERY  (CMD): ICD-10-CM

## 2024-06-26 DIAGNOSIS — Z99.2 ESRD (END STAGE RENAL DISEASE) ON DIALYSIS  (CMD): Primary | ICD-10-CM

## 2024-06-26 DIAGNOSIS — D63.1 ANEMIA DUE TO END STAGE RENAL DISEASE  (CMD): ICD-10-CM

## 2024-06-26 DIAGNOSIS — N18.6 ANEMIA DUE TO END STAGE RENAL DISEASE  (CMD): ICD-10-CM

## 2024-06-26 LAB
*MEAN CORPUSCULAR HGB CONC: 34.4 G/DL (ref 32.5–35.8)
ANION GAP: 10 MEQ/L (ref 6–15)
BASOPHIL AUTOMATED: 1.4 %
BASOPHILS: 0.1 (ref 0–0.14)
BEDSIDE GLUCOSE: 113 MG/DL (ref 70–110)
BEDSIDE GLUCOSE: 160 MG/DL (ref 70–110)
BEDSIDE GLUCOSE: 223 MG/DL (ref 70–110)
BEDSIDE GLUCOSE: 253 MG/DL (ref 70–110)
BLOOD UREA NITROGEN (BUN): 40 MG/DL (ref 7–25)
BUN/CREATININE RATIO: 7.5 (ref 7.4–23)
CALCIUM, SERUM: 8.9 MG/DL (ref 8.6–10.3)
CARBON DIOXIDE: 25 MEQ/L (ref 21–31)
CHLORIDE, SERUM: 101 MMOL/L (ref 98–107)
CHRONIC KIDNEY DISEASE STAGE: ABNORMAL
CREATININE: 5.3 MG/DL (ref 0.7–1.3)
EOSINOPHIL AUTOMATED: 18.3 %
EOSINOPHILS: 1.7 (ref 0–0.6)
EST GLOMERULAR FILTRATION RATE: 12 ML/MIN
GLUCOSE: 181 MG/DL (ref 70–99)
HEMATOCRIT: 23.3 % (ref 38.6–49.2)
HEMOGLOBIN: 8 GM/DL (ref 13–17)
K (POTASSIUM, SERUM): 4.3 MMOL/L (ref 3.5–5.2)
LYMPHOCYTE AUTOMATED: 11.2 %
LYMPHOCYTES: 1.1 (ref 0.78–3.73)
MEAN CORPUSCULAR HGB: 30.5 PG (ref 26–34)
MEAN CORPUSCULAR VOL: 88.8 FL (ref 80–100)
MEAN PLATELET VOLUME: 8 FL (ref 6.8–10.2)
MG (MAGNESIUM, SERUM: 2.3 MG/DL (ref 1.6–2.6)
MONOCYTE AUTOMATED: 13.8 %
MONOCYTES: 1.3 (ref 0.17–1)
NA (SODIUM, SERUM): 136 MMOL/L (ref 133–144)
NEUTROPHIL ABSOLUTE: 5.2 (ref 1.91–7.6)
NEUTROPHIL AUTOMATED: 55.3 %
PHOSPHORUS, SERUM: 4.6 MG/DL (ref 2.5–4.5)
PLATELET COUNT: 536 K/MM3 (ref 150–450)
RED BLOOD CELL COUNT: 2.62 M/MM3 (ref 4.34–5.6)
RED CELL DISTRIBUTIO: 14.1 % (ref 11.9–15.9)
WHITE BLOOD CELL COU: 9.4 K/MM3 (ref 4–11)

## 2024-06-26 PROCEDURE — 99232 SBSQ HOSP IP/OBS MODERATE 35: CPT | Performed by: INTERNAL MEDICINE

## 2024-06-27 ENCOUNTER — OFF PREMISE (OUTPATIENT)
Dept: NEPHROLOGY | Age: 54
End: 2024-06-27

## 2024-06-27 DIAGNOSIS — Z99.2 ESRD (END STAGE RENAL DISEASE) ON DIALYSIS  (CMD): Primary | ICD-10-CM

## 2024-06-27 DIAGNOSIS — N18.6 ESRD (END STAGE RENAL DISEASE) ON DIALYSIS  (CMD): Primary | ICD-10-CM

## 2024-06-27 LAB
BEDSIDE GLUCOSE: 168 MG/DL (ref 70–110)
K (POTASSIUM, SERUM): 4.7 MMOL/L (ref 3.5–5.2)
MG (MAGNESIUM, SERUM: 2.4 MG/DL (ref 1.6–2.6)

## 2024-06-27 PROCEDURE — 90935 HEMODIALYSIS ONE EVALUATION: CPT | Performed by: INTERNAL MEDICINE

## 2024-07-13 PROCEDURE — 93010 ELECTROCARDIOGRAM REPORT: CPT | Performed by: INTERNAL MEDICINE

## 2024-07-17 ENCOUNTER — EXTERNAL LAB (OUTPATIENT)
Dept: HEALTH INFORMATION MANAGEMENT | Facility: OTHER | Age: 54
End: 2024-07-17

## 2024-07-17 LAB
BUN SERPL-MCNC: 74 MG/DL (ref 5–28)
BUN/CREAT SERPL: 10 (ref 12–20)
CALCIUM SERPL-MCNC: 9.2 MG/DL (ref 8.4–10.2)
CHLORIDE SERPL-SCNC: 101 MEQ/L (ref 94–111)
CO2 SERPL-SCNC: 20 MEQ/L (ref 20–33)
CREAT SERPL-MCNC: 7.4 MG/DL (ref 0.5–1.4)
GFR SERPLBLD SCHWARTZ-ARVRAT: 8 ML/MIN/{1.73_M2}
GLUCOSE SERPL-MCNC: 186 MG/DL (ref 64–112)
LENGTH OF FAST TIME PATIENT: ABNORMAL H
POTASSIUM SERPL-SCNC: ABNORMAL MEQ/L (ref 3.5–5.1)
SODIUM SERPL-SCNC: 131 MEQ/L (ref 131–145)

## 2024-10-19 ENCOUNTER — HOSPITAL ENCOUNTER (OUTPATIENT)
Age: 54
Discharge: HOME | End: 2024-10-19
Payer: OTHER GOVERNMENT

## 2024-10-19 VITALS
TEMPERATURE: 97 F | DIASTOLIC BLOOD PRESSURE: 84 MMHG | SYSTOLIC BLOOD PRESSURE: 192 MMHG | OXYGEN SATURATION: 100 % | RESPIRATION RATE: 18 BRPM | HEART RATE: 79 BPM

## 2024-10-19 DIAGNOSIS — L05.01 PILONIDAL CYST WITH ABSCESS: Primary | ICD-10-CM

## 2024-10-19 RX ORDER — BISACODYL 5 MG/1
30 TABLET, DELAYED RELEASE ORAL
COMMUNITY
Start: 2024-10-03

## 2024-10-19 RX ORDER — CEPHALEXIN 500 MG/1
500 CAPSULE ORAL 3 TIMES DAILY
Qty: 30 CAPSULE | Refills: 0 | Status: SHIPPED | OUTPATIENT
Start: 2024-10-19 | End: 2024-10-29

## 2024-10-19 RX ORDER — LISINOPRIL 20 MG/1
20 TABLET ORAL
COMMUNITY
Start: 2022-04-21

## 2024-10-19 RX ORDER — NICOTINE POLACRILEX 4 MG/1
20 GUM, CHEWING ORAL
COMMUNITY
Start: 2024-01-08

## 2024-10-19 RX ORDER — HYDRALAZINE HYDROCHLORIDE 50 MG/1
25 TABLET, FILM COATED ORAL 3 TIMES DAILY
COMMUNITY
Start: 2022-04-20

## 2024-10-19 RX ORDER — GLIPIZIDE 10 MG/1
10 TABLET ORAL
COMMUNITY
Start: 2022-04-20

## 2024-10-19 RX ORDER — NITROGLYCERIN 0.4 MG/1
0.4 TABLET SUBLINGUAL
COMMUNITY
Start: 2024-09-30

## 2024-10-19 RX ORDER — METOPROLOL SUCCINATE 100 MG/1
50 TABLET, EXTENDED RELEASE ORAL
COMMUNITY
Start: 2024-08-22

## 2024-10-19 RX ORDER — MIDODRINE HYDROCHLORIDE 5 MG/1
10 TABLET ORAL
COMMUNITY
Start: 2024-08-22

## 2024-10-19 RX ORDER — ASPIRIN 81 MG/1
81 TABLET, CHEWABLE ORAL
COMMUNITY
Start: 2024-09-10

## 2024-10-19 RX ORDER — AMLODIPINE BESYLATE 10 MG/1
10 TABLET ORAL DAILY
COMMUNITY
Start: 2022-04-21

## 2024-10-19 RX ORDER — SPIRONOLACTONE 25 MG/1
50 TABLET ORAL
COMMUNITY
Start: 2024-01-08

## 2024-10-19 RX ORDER — GLIPIZIDE 5 MG/1
TABLET ORAL
COMMUNITY
Start: 2022-01-12

## 2024-10-19 RX ORDER — INSULIN GLARGINE-YFGN 100 [IU]/ML
INJECTION, SOLUTION SUBCUTANEOUS
COMMUNITY
Start: 2024-08-22

## 2024-10-19 RX ORDER — SEVELAMER CARBONATE 800 MG/1
800 TABLET, FILM COATED ORAL
COMMUNITY
Start: 2024-02-12

## 2024-10-19 RX ORDER — ROSUVASTATIN CALCIUM 40 MG/1
40 TABLET, COATED ORAL
COMMUNITY
Start: 2024-08-20

## 2024-10-19 NOTE — ED PROVIDER NOTES
Patient Seen in: [facility]      History     Chief Complaint   Patient presents with    Cyst     Stated Complaint: syst on bottom    Subjective:   HPI      54-year-old gentleman with moderate medical history.  Patient has had a painful lesion to his right buttock for the last few days.  Attempted to pop the lesion himself unsuccessfully.  Now increased pain and swelling.  No systemic fever or chills.  No active drainage.    Objective:     Past Medical History:    Atherosclerosis of coronary artery    Diabetes (HCC)    Kidney failure              Past Surgical History:   Procedure Laterality Date    Coronary stent placement      Dialysis procedure      Removal of single toe,each                  Social History     Socioeconomic History    Marital status: Single   Tobacco Use    Smoking status: Never     Passive exposure: Current    Smokeless tobacco: Never     Social Drivers of Health      Received from [facility]    Social Connections    Received from [facility]    Housing Stability              Review of Systems    Positive for stated complaint: syst on bottom  Other systems are as noted in HPI.  Constitutional and vital signs reviewed.      All other systems reviewed and negative except as noted above.    Physical Exam     ED Triage Vitals [10/19/24 1518]   BP    Pulse 79   Resp 18   Temp 97.2 °F (36.2 °C)   Temp src Temporal   SpO2 100 %   O2 Device None (Room air)       Current Vitals:   Vital Signs  Pulse: 79  Resp: 18  Temp: 97.2 °F (36.2 °C)  Temp src: Temporal    Oxygen Therapy  SpO2: 100 %  O2 Device: None (Room air)        Physical Exam  Gen: Well appearing, well groomed, alert and aware x 3  Lung: No distress, RR, no retraction  Extremities: Full ROM, no deformity, NVI  Skin: Gluteal cleft.  Erythematous slightly fluctuant lesion roughly 2 cm in circumference.  Neuro:  Normal Gait      ED Course   Labs Reviewed - No data to display                MDM           Normal vital signs  Skin: Gluteal cleft.  Erythematous slightly fluctuant lesion roughly 2 cm in circumference.    Verbal consent for the following procedure was obtained.  We discussed the inherent risks of procedure.  We discussed benefits.  Patient agrees to proceed with the procedure and verbalizes understanding of potential complications.      Using 1% plain lidocaine, local injections were performed.  Site was sterilized.  Using a 11 blade on handle a 1.5 cm Law incision was performed.  Site thoroughly explored with blunt instrument.  Mostly bloody drainage with very minimal purulence.  Packing no longer recommended.  Epson salt warm soaks recommended.  Will initiate Keflex 3 times daily.  ER for worsening.      Medical Decision Making      Disposition and Plan     Clinical Impression:  1. Pilonidal cyst with abscess         Disposition:  Discharge  10/19/2024  3:40 pm    Follow-up:  Hilario Díaz MD  [address] [place] [id number] [phone]                Medications Prescribed:  Current Discharge Medication List        START taking these medications    Details   cephALEXin 500 MG Oral Cap Take 1 capsule (500 mg total) by mouth 3 (three) times daily for 10 days.  Qty: 30 capsule, Refills: 0                 Supplementary Documentation:

## 2024-12-12 ENCOUNTER — EXTERNAL RECORD (OUTPATIENT)
Dept: OTHER | Age: 54
End: 2024-12-12

## 2024-12-13 ENCOUNTER — EXTERNAL RECORD (OUTPATIENT)
Dept: CARDIOLOGY | Age: 54
End: 2024-12-13

## 2024-12-13 ENCOUNTER — OFF PREMISE (OUTPATIENT)
Dept: NEPHROLOGY | Age: 54
End: 2024-12-13

## 2024-12-13 ENCOUNTER — EXTERNAL RECORD (OUTPATIENT)
Dept: NEPHROLOGY | Age: 54
End: 2024-12-13

## 2024-12-13 DIAGNOSIS — N18.6 ESRD (END STAGE RENAL DISEASE) ON DIALYSIS  (CMD): Primary | ICD-10-CM

## 2024-12-13 DIAGNOSIS — I10 ESSENTIAL HYPERTENSION: ICD-10-CM

## 2024-12-13 DIAGNOSIS — N18.6 ANEMIA DUE TO END STAGE RENAL DISEASE  (CMD): ICD-10-CM

## 2024-12-13 DIAGNOSIS — I25.10 ASHD (ARTERIOSCLEROTIC HEART DISEASE): ICD-10-CM

## 2024-12-13 DIAGNOSIS — D63.1 ANEMIA DUE TO END STAGE RENAL DISEASE  (CMD): ICD-10-CM

## 2024-12-13 DIAGNOSIS — Z99.2 ESRD (END STAGE RENAL DISEASE) ON DIALYSIS  (CMD): Primary | ICD-10-CM

## 2024-12-13 PROCEDURE — 99223 1ST HOSP IP/OBS HIGH 75: CPT | Performed by: INTERNAL MEDICINE

## 2024-12-14 ENCOUNTER — TELEPHONE (OUTPATIENT)
Dept: CARDIOLOGY | Age: 54
End: 2024-12-14

## 2024-12-14 ENCOUNTER — MOBILE (OUTPATIENT)
Dept: CARDIOLOGY | Age: 54
End: 2024-12-14

## 2024-12-14 ENCOUNTER — APPOINTMENT (OUTPATIENT)
Dept: OTHER | Facility: PHYSICIAN GROUP | Age: 54
End: 2024-12-14

## 2024-12-14 ENCOUNTER — EXTERNAL RECORD (OUTPATIENT)
Dept: OTHER | Facility: PHYSICIAN GROUP | Age: 54
End: 2024-12-14

## 2024-12-14 DIAGNOSIS — Z79.4 TYPE 2 DIABETES MELLITUS WITH OTHER SPECIFIED COMPLICATION, WITH LONG-TERM CURRENT USE OF INSULIN (CMD): ICD-10-CM

## 2024-12-14 DIAGNOSIS — E78.2 MIXED HYPERLIPIDEMIA: ICD-10-CM

## 2024-12-14 DIAGNOSIS — I73.9 PVD (PERIPHERAL VASCULAR DISEASE) (CMD): ICD-10-CM

## 2024-12-14 DIAGNOSIS — I10 PRIMARY HYPERTENSION: ICD-10-CM

## 2024-12-14 DIAGNOSIS — I20.0 UNSTABLE ANGINA  (CMD): Primary | ICD-10-CM

## 2024-12-14 DIAGNOSIS — E11.69 TYPE 2 DIABETES MELLITUS WITH OTHER SPECIFIED COMPLICATION, WITH LONG-TERM CURRENT USE OF INSULIN (CMD): ICD-10-CM

## 2024-12-14 PROCEDURE — 93010 ELECTROCARDIOGRAM REPORT: CPT | Performed by: INTERNAL MEDICINE

## 2025-01-10 ENCOUNTER — TELEPHONE (OUTPATIENT)
Dept: CARDIOLOGY | Age: 55
End: 2025-01-10

## 2025-02-15 PROBLEM — E78.2 MIXED HYPERLIPIDEMIA: Status: ACTIVE | Noted: 2024-06-13

## 2025-02-15 PROBLEM — I25.118 CORONARY ARTERY DISEASE OF NATIVE ARTERY OF NATIVE HEART WITH STABLE ANGINA PECTORIS (CMD): Status: ACTIVE | Noted: 2024-06-13

## 2025-02-20 ENCOUNTER — APPOINTMENT (OUTPATIENT)
Dept: CARDIOLOGY | Age: 55
End: 2025-02-20

## 2025-02-20 VITALS
HEIGHT: 68 IN | DIASTOLIC BLOOD PRESSURE: 80 MMHG | OXYGEN SATURATION: 99 % | WEIGHT: 178 LBS | BODY MASS INDEX: 26.98 KG/M2 | RESPIRATION RATE: 18 BRPM | SYSTOLIC BLOOD PRESSURE: 160 MMHG | HEART RATE: 79 BPM

## 2025-02-20 DIAGNOSIS — N18.5 CKD (CHRONIC KIDNEY DISEASE) STAGE 5, GFR LESS THAN 15 ML/MIN  (CMD): ICD-10-CM

## 2025-02-20 DIAGNOSIS — I10 PRIMARY HYPERTENSION: ICD-10-CM

## 2025-02-20 DIAGNOSIS — E78.2 MIXED HYPERLIPIDEMIA: ICD-10-CM

## 2025-02-20 DIAGNOSIS — I25.2 HISTORY OF ST ELEVATION MYOCARDIAL INFARCTION (STEMI): Primary | ICD-10-CM

## 2025-02-20 DIAGNOSIS — I73.9 PVD (PERIPHERAL VASCULAR DISEASE) (CMD): ICD-10-CM

## 2025-02-20 DIAGNOSIS — I25.118 CORONARY ARTERY DISEASE OF NATIVE ARTERY OF NATIVE HEART WITH STABLE ANGINA PECTORIS (CMD): ICD-10-CM

## 2025-02-20 DIAGNOSIS — Z78.9 NONSMOKER: ICD-10-CM

## 2025-02-20 PROBLEM — I25.10 CORONARY ARTERY DISEASE: Status: ACTIVE | Noted: 2025-02-20

## 2025-02-20 PROBLEM — Z79.4 TYPE 2 DIABETES MELLITUS WITH DIABETIC PERIPHERAL ANGIOPATHY WITHOUT GANGRENE, WITH LONG-TERM CURRENT USE OF INSULIN  (CMD): Status: ACTIVE | Noted: 2025-02-20

## 2025-02-20 PROBLEM — Z12.11 ENCOUNTER FOR SCREENING FOR MALIGNANT NEOPLASM OF COLON: Status: ACTIVE | Noted: 2025-02-20

## 2025-02-20 PROBLEM — E11.40 TYPE 2 DIABETES MELLITUS WITH DIABETIC NEUROPATHY, UNSPECIFIED  (CMD): Status: ACTIVE | Noted: 2025-02-20

## 2025-02-20 PROBLEM — E11.51 TYPE 2 DIABETES MELLITUS WITH DIABETIC PERIPHERAL ANGIOPATHY WITHOUT GANGRENE, WITH LONG-TERM CURRENT USE OF INSULIN  (CMD): Status: ACTIVE | Noted: 2025-02-20

## 2025-02-20 PROCEDURE — 93000 ELECTROCARDIOGRAM COMPLETE: CPT | Performed by: INTERNAL MEDICINE

## 2025-02-20 PROCEDURE — 99214 OFFICE O/P EST MOD 30 MIN: CPT | Performed by: INTERNAL MEDICINE

## 2025-02-20 PROCEDURE — 3079F DIAST BP 80-89 MM HG: CPT | Performed by: INTERNAL MEDICINE

## 2025-02-20 PROCEDURE — 3077F SYST BP >= 140 MM HG: CPT | Performed by: INTERNAL MEDICINE

## 2025-02-20 RX ORDER — INSULIN GLARGINE-YFGN 100 [IU]/ML
INJECTION, SOLUTION SUBCUTANEOUS
COMMUNITY
Start: 2024-12-26

## 2025-02-20 RX ORDER — ASPIRIN 81 MG/1
81 TABLET, CHEWABLE ORAL
COMMUNITY
Start: 2024-09-10

## 2025-02-20 RX ORDER — MIDODRINE HYDROCHLORIDE 5 MG/1
10 TABLET ORAL
COMMUNITY
Start: 2025-01-30

## 2025-02-20 RX ORDER — NITROGLYCERIN 0.4 MG/1
0.4 TABLET SUBLINGUAL
COMMUNITY
Start: 2024-09-30

## 2025-02-20 RX ORDER — ROSUVASTATIN CALCIUM 40 MG/1
40 TABLET, COATED ORAL
COMMUNITY
Start: 2025-02-18

## 2025-02-20 RX ORDER — LISINOPRIL 40 MG/1
1 TABLET ORAL DAILY
COMMUNITY
Start: 2025-01-16

## 2025-02-20 ASSESSMENT — ENCOUNTER SYMPTOMS
CONSTITUTIONAL NEGATIVE: 1
NEUROLOGICAL NEGATIVE: 1
HEMATOLOGIC/LYMPHATIC NEGATIVE: 1
GASTROINTESTINAL NEGATIVE: 1
PSYCHIATRIC NEGATIVE: 1
ENDOCRINE NEGATIVE: 1
RESPIRATORY NEGATIVE: 1
EYES NEGATIVE: 1
ALLERGIC/IMMUNOLOGIC NEGATIVE: 1

## 2025-02-24 ENCOUNTER — TELEPHONE (OUTPATIENT)
Dept: CARDIOLOGY | Age: 55
End: 2025-02-24

## 2025-04-14 ENCOUNTER — HOSPITAL ENCOUNTER (OUTPATIENT)
Age: 55
Discharge: OTHER INSTITUTION | End: 2025-04-14
Payer: MEDICAID

## 2025-04-14 VITALS
TEMPERATURE: 98 F | OXYGEN SATURATION: 99 % | RESPIRATION RATE: 18 BRPM | WEIGHT: 170 LBS | DIASTOLIC BLOOD PRESSURE: 111 MMHG | BODY MASS INDEX: 30.12 KG/M2 | SYSTOLIC BLOOD PRESSURE: 211 MMHG | HEIGHT: 63 IN | HEART RATE: 73 BPM

## 2025-04-14 DIAGNOSIS — R10.9 ABDOMINAL PAIN OF UNKNOWN ETIOLOGY: Primary | ICD-10-CM

## 2025-04-14 DIAGNOSIS — I10 HYPERTENSION, UNSPECIFIED TYPE: ICD-10-CM

## 2025-04-14 DIAGNOSIS — N18.6 ESRD ON DIALYSIS (HCC): ICD-10-CM

## 2025-04-14 DIAGNOSIS — Z99.2 ESRD ON DIALYSIS (HCC): ICD-10-CM

## 2025-04-14 PROCEDURE — 99205 OFFICE O/P NEW HI 60 MIN: CPT | Performed by: NURSE PRACTITIONER

## 2025-04-14 NOTE — ED INITIAL ASSESSMENT (HPI)
Patient reports constipation and generalized abdominal pain, last bowel movement 3 days ago, pt receives dialysis MWF, had dialysis this morning and he states pain worse following dialysis, denies NV

## 2025-04-14 NOTE — ED PROVIDER NOTES
Patient Seen in: [facility]    History     Chief Complaint   Patient presents with    Abdominal Pain    Constipation     Stated Complaint: constipation    Subjective:   This is a 54-year-old male with a history of diabetes, end-stage renal disease on dialysis, and coronary artery disease.  Presents to immediate care for 3 days of abdominal pain.  Reports he came here straight from dialysis.  States he has not a BM in 3 days.  Denies any urinary symptoms.  Denies any chest pain or shortness of breath.  Denies any headaches or dizziness.  Denies any treatment attempted prior to arrival.     The history is provided by the patient.             Objective:     Past Medical History:    Atherosclerosis of coronary artery    Diabetes (HCC)    Kidney failure              Past Surgical History:   Procedure Laterality Date    Coronary stent placement      Dialysis procedure      Removal of single toe,each                  Social History     Socioeconomic History    Marital status: Single   Tobacco Use    Smoking status: Never     Passive exposure: Current    Smokeless tobacco: Never     Social Drivers of Health      Received from [facility]    Housing Stability              Review of Systems   Constitutional:  Negative for chills and fever.   HENT:  Negative for congestion and sore throat.    Respiratory:  Negative for cough, shortness of breath and wheezing.    Cardiovascular:  Negative for chest pain, palpitations and leg swelling.   Gastrointestinal:  Positive for abdominal pain and constipation. Negative for diarrhea, nausea and vomiting.   Genitourinary:  Negative for dysuria.   Musculoskeletal:  Negative for back pain, neck pain and neck stiffness.   Skin:  Negative for rash.   Neurological:  Negative for dizziness and headaches.       Positive for stated complaint: constipation  Other systems are as noted in HPI.  Constitutional and vital signs reviewed.      All other systems reviewed  and negative except as noted above.    Physical Exam     ED Triage Vitals [04/14/25 1210]   BP (!) 211/111   Pulse 73   Resp 18   Temp 97.7 °F (36.5 °C)   Temp src Oral   SpO2 99 %   O2 Device None (Room air)       Current Vitals:   Vital Signs  BP: (!) 211/111 (RN made aware and patient does have a hx of HTN and is in alot of pain.)  Pulse: 73  Resp: 18  Temp: 97.7 °F (36.5 °C)  Temp src: Oral    Oxygen Therapy  SpO2: 99 %  O2 Device: None (Room air)        Physical Exam  Vitals and nursing note reviewed.   Constitutional:       General: He is not in acute distress.     Appearance: Normal appearance. He is not ill-appearing, toxic-appearing or diaphoretic.   HENT:      Head: Normocephalic and atraumatic.      Right Ear: External ear normal.      Left Ear: External ear normal.      Nose: Nose normal.      Mouth/Throat:      Mouth: Mucous membranes are moist.      Pharynx: Oropharynx is clear.   Eyes:      General:         Right eye: No discharge.         Left eye: No discharge.      Extraocular Movements: Extraocular movements intact.      Conjunctiva/sclera: Conjunctivae normal.   Cardiovascular:      Rate and Rhythm: Normal rate.      Heart sounds: Normal heart sounds.   Pulmonary:      Effort: Pulmonary effort is normal.      Breath sounds: Normal breath sounds.   Abdominal:      Tenderness: There is abdominal tenderness in the right upper quadrant, right lower quadrant and epigastric area. There is no right CVA tenderness or left CVA tenderness.   Musculoskeletal:      Cervical back: Neck supple.      Right lower leg: No edema.      Left lower leg: No edema.   Skin:     General: Skin is warm and dry.      Capillary Refill: Capillary refill takes less than 2 seconds.      Findings: No rash.   Neurological:      Mental Status: He is alert and oriented to person, place, and time.   Psychiatric:         Mood and Affect: Mood normal.         Behavior: Behavior normal.             ED Course   Labs Reviewed - No data  to display         DC to ED                     MDM        Patient is hypertensive with otherwise stable vital signs.  Presents to immediate care for abdominal pain with 3 days of constipation.  Patient arrived here straight from dialysis center.    Differential diagnosis includes but is not limited to constipation, obstruction, appendicitis, did not reticulitis, colitis, dissection    Patient completed hemodialysis just prior to arrival.  He arrives with a blood pressure of 211/111.    Tenderness in all quadrants of the abdomen.    It was discussed in detail with patient the limitations in this clinic setting.  Patient would benefit from evaluation in higher level of care at the emergency department.    He was offered an ambulance to the emergency department for evaluation.  Patient refused stating he preferred to drive himself.  Risk discussed.  He verbalized understanding.  Stable at time of discharge.    Medical Decision Making      Disposition and Plan     Clinical Impression:  1. Abdominal pain of unknown etiology    2. Hypertension, unspecified type    3. ESRD on dialysis (HCC)         Disposition:  Ic to ed  4/14/2025 12:16 pm    Follow-up:  No follow-up provider specified.        Medications Prescribed:  Discharge Medication List as of 4/14/2025 12:19 PM          Supplementary Documentation:

## 2025-04-22 ENCOUNTER — APPOINTMENT (OUTPATIENT)
Dept: CARDIOLOGY | Age: 55
End: 2025-04-22
Attending: INTERNAL MEDICINE

## 2025-04-25 ENCOUNTER — TELEPHONE (OUTPATIENT)
Dept: CARDIOLOGY | Age: 55
End: 2025-04-25

## 2025-04-29 ENCOUNTER — TELEPHONE (OUTPATIENT)
Dept: CARDIOLOGY | Age: 55
End: 2025-04-29

## 2025-05-22 ENCOUNTER — TELEPHONE (OUTPATIENT)
Dept: CARDIOLOGY | Age: 55
End: 2025-05-22

## 2025-06-03 ENCOUNTER — APPOINTMENT (OUTPATIENT)
Dept: CARDIOLOGY | Age: 55
End: 2025-06-03
Attending: INTERNAL MEDICINE

## 2025-06-03 DIAGNOSIS — I73.9 PVD (PERIPHERAL VASCULAR DISEASE) (CMD): ICD-10-CM

## 2025-06-03 DIAGNOSIS — I25.118 CORONARY ARTERY DISEASE OF NATIVE ARTERY OF NATIVE HEART WITH STABLE ANGINA PECTORIS (CMD): ICD-10-CM

## 2025-06-03 DIAGNOSIS — I25.2 HISTORY OF ST ELEVATION MYOCARDIAL INFARCTION (STEMI): ICD-10-CM

## 2025-06-03 DIAGNOSIS — E78.2 MIXED HYPERLIPIDEMIA: ICD-10-CM

## 2025-06-03 DIAGNOSIS — Z78.9 NONSMOKER: ICD-10-CM

## 2025-06-03 DIAGNOSIS — N18.5 CKD (CHRONIC KIDNEY DISEASE) STAGE 5, GFR LESS THAN 15 ML/MIN  (CMD): ICD-10-CM

## 2025-06-03 DIAGNOSIS — I10 PRIMARY HYPERTENSION: ICD-10-CM

## 2025-06-03 PROCEDURE — 93925 LOWER EXTREMITY STUDY: CPT | Performed by: INTERNAL MEDICINE

## 2025-06-04 ENCOUNTER — RESULTS FOLLOW-UP (OUTPATIENT)
Dept: CARDIOLOGY | Age: 55
End: 2025-06-04

## 2025-06-05 ENCOUNTER — TELEPHONE (OUTPATIENT)
Dept: CARDIOLOGY | Age: 55
End: 2025-06-05

## 2025-09-04 ENCOUNTER — APPOINTMENT (OUTPATIENT)
Dept: CARDIOLOGY | Age: 55
End: 2025-09-04

## 2025-09-30 ENCOUNTER — APPOINTMENT (OUTPATIENT)
Dept: CARDIOLOGY | Age: 55
End: 2025-09-30